# Patient Record
(demographics unavailable — no encounter records)

---

## 2025-07-11 NOTE — END OF VISIT
[FreeTextEntry3] : I personally saw and examined MD HARRIS in detail.I have made changes in changes in the body of the note where appropriate. I personally reviewed the HPI, PMH, FH, SH, ROS and medications/allergies. I have spoken to PRELA Bergman regarding the history and have personally determined the assessment and plan of care and documented this myself.. I performed all procedures and performed the physical exam. I have made changes in the body of the note where appropriate.   Attesting Faculty: See Attending Signature Below

## 2025-07-11 NOTE — ASSESSMENT
[FreeTextEntry1] : 76 year old male following up for evaluation of hearing loss. On exam, sclerottic with monomeric areas on right.  Left with drying and cerumen. Cleared today with suction and currette.   Asymmetrical SNHL: - Audiogram today - Right asymmetry  - Hearing aid evaluation - Cleared for HA in both ears Rec MRI of IAC;s to r/o AN  wax- Rx:  Debrox was prescribed and  is to be placed in both ears on a routine basis to keep them free of wax. Routine debridement suggested to keep the ears free of wax.  f/u after data collection and 4 months and prn

## 2025-07-11 NOTE — PROCEDURE
[FreeTextEntry3] : Procedure - Cerumen Removal. Indication - Obstructing visualization of TM After informed verbal consent is obtained, cerumen was removed from the   left       ear canal(s) with a curette and suction.  Normal appearing canal(s) following removal.

## 2025-07-11 NOTE — PHYSICAL EXAM
[Midline] : trachea located in midline position [Normal] : no rashes [de-identified] : Sclerottic with monomeric areas on right.  Left with drying and cerumen

## 2025-07-11 NOTE — HISTORY OF PRESENT ILLNESS
[de-identified] : 74 y/o M with decreased over time R > L.  Pos tinnitus.  No vertigo or pain.  Pt. has an audiogram from 11/30/2021 showing Right asymmetric SNHL.  Never had MRI brain/IAC.  Had Right TM perforation about 25 years ago with spontaneous repair.  Also notes gets throat infection (believes it is tonsillitis) about 2-3 times a year for several years, usually during the winter.  He is tarted with Azithromycin for these infections.  Notes he is just getting over an episode, completing abx yesterday.  no other modifying factors no other nasal or throat complaints [FreeTextEntry1] : 07/11/25  Patient is following up for hearing evaluation. States has hearing aids and is concerned his hearing is worse. Denies Vertigo, tinnitus, pain, drainage or facial weakness.

## 2025-07-11 NOTE — DATA REVIEWED
[de-identified] : Hearing Test performed to evaluate the extent of hearing loss and  to explain pt's symptoms today's hearing test was personally reviewed and revealed Tymps-wnl Hearing-bilat SNHL R>L